# Patient Record
Sex: MALE | Race: BLACK OR AFRICAN AMERICAN | NOT HISPANIC OR LATINO | URBAN - METROPOLITAN AREA
[De-identification: names, ages, dates, MRNs, and addresses within clinical notes are randomized per-mention and may not be internally consistent; named-entity substitution may affect disease eponyms.]

---

## 2017-08-24 ENCOUNTER — EMERGENCY (EMERGENCY)
Facility: HOSPITAL | Age: 77
LOS: 1 days | Discharge: HOME | End: 2017-08-24
Attending: EMERGENCY MEDICINE | Admitting: EMERGENCY MEDICINE
Payer: MEDICARE

## 2017-08-24 VITALS
OXYGEN SATURATION: 100 % | SYSTOLIC BLOOD PRESSURE: 141 MMHG | DIASTOLIC BLOOD PRESSURE: 89 MMHG | RESPIRATION RATE: 18 BRPM | HEART RATE: 97 BPM

## 2017-08-24 VITALS
DIASTOLIC BLOOD PRESSURE: 98 MMHG | OXYGEN SATURATION: 98 % | TEMPERATURE: 99 F | SYSTOLIC BLOOD PRESSURE: 141 MMHG | HEART RATE: 98 BPM | RESPIRATION RATE: 16 BRPM

## 2017-08-24 PROBLEM — Z00.00 ENCOUNTER FOR PREVENTIVE HEALTH EXAMINATION: Status: ACTIVE | Noted: 2017-08-24

## 2017-08-24 PROCEDURE — 12002 RPR S/N/AX/GEN/TRNK2.6-7.5CM: CPT

## 2017-08-24 PROCEDURE — 99285 EMERGENCY DEPT VISIT HI MDM: CPT | Mod: 25,GC

## 2017-08-24 PROCEDURE — 70450 CT HEAD/BRAIN W/O DYE: CPT | Mod: 26

## 2017-08-24 RX ORDER — ACETAMINOPHEN 500 MG
650 TABLET ORAL ONCE
Qty: 0 | Refills: 0 | Status: COMPLETED | OUTPATIENT
Start: 2017-08-24 | End: 2017-08-24

## 2017-08-24 RX ADMIN — Medication 650 MILLIGRAM(S): at 11:10

## 2017-08-24 NOTE — ED PROCEDURE NOTE - CPROC ED POST PROC CARE GUIDE1
Verbal/written post procedure instructions were given to patient/caregiver./Instructed patient/caregiver to follow-up with primary care physician./Instructed patient/caregiver regarding signs and symptoms of infection./Keep the cast/splint/dressing clean and dry.
Instructed patient/caregiver to follow-up with primary care physician./Verbal/written post procedure instructions were given to patient/caregiver./Keep the cast/splint/dressing clean and dry./Instructed patient/caregiver regarding signs and symptoms of infection.
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
## 2017-08-24 NOTE — ED PROVIDER NOTE - PLAN OF CARE
You were seen today and found to have a laceration.  This was repaired with stitches.  Be sure to keep the wound clean.  It is ok to shower as usual, but do not let the wound soak.  No pools, baths, or hot tubs.  Be sure to follow up with your primary care physician in 5-7 days for re-evaluation to see if the stitches are ready to come out.  RETURN TO THE EMERGENCY DEPARTMENT IMMEDIATELY IF YOU DEVELOP PUS DRAINING FROM THE WOUND, SPREADING REDNESS, OR FOR ANY OTHER CONCERN.
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
## 2017-08-24 NOTE — ED PROCEDURE NOTE - CPROC ED LACER REPAIR DETAIL1
No foreign body/The wound was explored to base in bloodless field.
No foreign body/The wound was explored to base in bloodless field.
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
## 2017-08-24 NOTE — ED PROCEDURE NOTE - NS_EDPROVIDERDISPOUSERTYPE_ED_A_ED
Attending Attestation (For Attendings USE Only)...
Attending Attestation (For Attendings USE Only)...

## 2022-08-07 ENCOUNTER — EMERGENCY (EMERGENCY)
Facility: HOSPITAL | Age: 82
LOS: 1 days | Discharge: HOME | End: 2022-08-07
Admitting: EMERGENCY MEDICINE

## 2022-08-07 VITALS
RESPIRATION RATE: 16 BRPM | TEMPERATURE: 98 F | SYSTOLIC BLOOD PRESSURE: 155 MMHG | HEART RATE: 60 BPM | OXYGEN SATURATION: 98 % | DIASTOLIC BLOOD PRESSURE: 84 MMHG

## 2022-08-07 VITALS
TEMPERATURE: 97 F | WEIGHT: 175.05 LBS | DIASTOLIC BLOOD PRESSURE: 98 MMHG | HEART RATE: 65 BPM | SYSTOLIC BLOOD PRESSURE: 155 MMHG | RESPIRATION RATE: 18 BRPM | OXYGEN SATURATION: 98 %

## 2022-08-07 LAB
ALBUMIN SERPL ELPH-MCNC: 4.1 G/DL — SIGNIFICANT CHANGE UP (ref 3.4–5)
ALP SERPL-CCNC: 104 U/L — SIGNIFICANT CHANGE UP (ref 40–120)
ALT FLD-CCNC: 62 U/L — HIGH (ref 12–42)
ANION GAP SERPL CALC-SCNC: 4 MMOL/L — LOW (ref 9–16)
APPEARANCE UR: CLEAR — SIGNIFICANT CHANGE UP
AST SERPL-CCNC: 24 U/L — SIGNIFICANT CHANGE UP (ref 15–37)
BACTERIA # UR AUTO: PRESENT /HPF
BASOPHILS # BLD AUTO: 0.09 K/UL — SIGNIFICANT CHANGE UP (ref 0–0.2)
BASOPHILS NFR BLD AUTO: 1.1 % — SIGNIFICANT CHANGE UP (ref 0–2)
BILIRUB SERPL-MCNC: 0.6 MG/DL — SIGNIFICANT CHANGE UP (ref 0.2–1.2)
BILIRUB UR-MCNC: NEGATIVE — SIGNIFICANT CHANGE UP
BUN SERPL-MCNC: 14 MG/DL — SIGNIFICANT CHANGE UP (ref 7–23)
CALCIUM SERPL-MCNC: 9.1 MG/DL — SIGNIFICANT CHANGE UP (ref 8.5–10.5)
CHLORIDE SERPL-SCNC: 107 MMOL/L — SIGNIFICANT CHANGE UP (ref 96–108)
CO2 SERPL-SCNC: 27 MMOL/L — SIGNIFICANT CHANGE UP (ref 22–31)
COLOR SPEC: YELLOW — SIGNIFICANT CHANGE UP
COMMENT - URINE: SIGNIFICANT CHANGE UP
CREAT SERPL-MCNC: 0.98 MG/DL — SIGNIFICANT CHANGE UP (ref 0.5–1.3)
DIFF PNL FLD: NEGATIVE — SIGNIFICANT CHANGE UP
EGFR: 78 ML/MIN/1.73M2 — SIGNIFICANT CHANGE UP
EOSINOPHIL # BLD AUTO: 0.25 K/UL — SIGNIFICANT CHANGE UP (ref 0–0.5)
EOSINOPHIL NFR BLD AUTO: 3.2 % — SIGNIFICANT CHANGE UP (ref 0–6)
GLUCOSE SERPL-MCNC: 141 MG/DL — HIGH (ref 70–99)
GLUCOSE UR QL: NEGATIVE — SIGNIFICANT CHANGE UP
HCT VFR BLD CALC: 47.9 % — SIGNIFICANT CHANGE UP (ref 39–50)
HGB BLD-MCNC: 16.1 G/DL — SIGNIFICANT CHANGE UP (ref 13–17)
IMM GRANULOCYTES NFR BLD AUTO: 0.4 % — SIGNIFICANT CHANGE UP (ref 0–1.5)
KETONES UR-MCNC: NEGATIVE — SIGNIFICANT CHANGE UP
LEUKOCYTE ESTERASE UR-ACNC: NEGATIVE — SIGNIFICANT CHANGE UP
LYMPHOCYTES # BLD AUTO: 1.58 K/UL — SIGNIFICANT CHANGE UP (ref 1–3.3)
LYMPHOCYTES # BLD AUTO: 19.9 % — SIGNIFICANT CHANGE UP (ref 13–44)
MCHC RBC-ENTMCNC: 31.7 PG — SIGNIFICANT CHANGE UP (ref 27–34)
MCHC RBC-ENTMCNC: 33.6 GM/DL — SIGNIFICANT CHANGE UP (ref 32–36)
MCV RBC AUTO: 94.3 FL — SIGNIFICANT CHANGE UP (ref 80–100)
MONOCYTES # BLD AUTO: 0.95 K/UL — HIGH (ref 0–0.9)
MONOCYTES NFR BLD AUTO: 12 % — SIGNIFICANT CHANGE UP (ref 2–14)
NEUTROPHILS # BLD AUTO: 5.02 K/UL — SIGNIFICANT CHANGE UP (ref 1.8–7.4)
NEUTROPHILS NFR BLD AUTO: 63.4 % — SIGNIFICANT CHANGE UP (ref 43–77)
NITRITE UR-MCNC: NEGATIVE — SIGNIFICANT CHANGE UP
NRBC # BLD: 0 /100 WBCS — SIGNIFICANT CHANGE UP (ref 0–0)
PH UR: 6 — SIGNIFICANT CHANGE UP (ref 5–8)
PLATELET # BLD AUTO: 165 K/UL — SIGNIFICANT CHANGE UP (ref 150–400)
POTASSIUM SERPL-MCNC: 4 MMOL/L — SIGNIFICANT CHANGE UP (ref 3.5–5.3)
POTASSIUM SERPL-SCNC: 4 MMOL/L — SIGNIFICANT CHANGE UP (ref 3.5–5.3)
PROT SERPL-MCNC: 8.2 G/DL — SIGNIFICANT CHANGE UP (ref 6.4–8.2)
PROT UR-MCNC: 30 MG/DL
RBC # BLD: 5.08 M/UL — SIGNIFICANT CHANGE UP (ref 4.2–5.8)
RBC # FLD: 14.2 % — SIGNIFICANT CHANGE UP (ref 10.3–14.5)
RBC CASTS # UR COMP ASSIST: < 5 /HPF — SIGNIFICANT CHANGE UP
SARS-COV-2 RNA SPEC QL NAA+PROBE: SIGNIFICANT CHANGE UP
SODIUM SERPL-SCNC: 138 MMOL/L — SIGNIFICANT CHANGE UP (ref 132–145)
SP GR SPEC: 1.02 — SIGNIFICANT CHANGE UP (ref 1–1.03)
UROBILINOGEN FLD QL: 0.2 E.U./DL — SIGNIFICANT CHANGE UP
WBC # BLD: 7.92 K/UL — SIGNIFICANT CHANGE UP (ref 3.8–10.5)
WBC # FLD AUTO: 7.92 K/UL — SIGNIFICANT CHANGE UP (ref 3.8–10.5)
WBC UR QL: < 5 /HPF — SIGNIFICANT CHANGE UP

## 2022-08-07 PROCEDURE — 71045 X-RAY EXAM CHEST 1 VIEW: CPT | Mod: 26

## 2022-08-07 PROCEDURE — 99284 EMERGENCY DEPT VISIT MOD MDM: CPT | Mod: CS,25

## 2022-08-07 PROCEDURE — 93010 ELECTROCARDIOGRAM REPORT: CPT

## 2022-08-07 RX ORDER — CEFUROXIME AXETIL 250 MG
250 TABLET ORAL ONCE
Refills: 0 | Status: COMPLETED | OUTPATIENT
Start: 2022-08-07 | End: 2022-08-07

## 2022-08-07 RX ORDER — CIPROFLOXACIN LACTATE 400MG/40ML
1 VIAL (ML) INTRAVENOUS
Qty: 7 | Refills: 0
Start: 2022-08-07 | End: 2022-08-13

## 2022-08-07 RX ADMIN — Medication 250 MILLIGRAM(S): at 14:53

## 2022-08-07 NOTE — ED PROVIDER NOTE - PATIENT PORTAL LINK FT
You can access the FollowMyHealth Patient Portal offered by [facility] by registering at the following website: [email or web address]. By joining [employer]’s FollowMyHealth portal, you will also be able to view your health information using other applications (apps) compatible with our system.

## 2022-08-07 NOTE — ED ADULT NURSE NOTE - CHIEF COMPLAINT QUOTE
pt is visually impaired brought in from [facility] for lack of appetite- pt with no other medical complaints

## 2022-08-07 NOTE — ED ADULT NURSE NOTE - OBJECTIVE STATEMENT
BIBA from [facility] presents to ED for further evaluation of loss of appetite for the past few days. pt denies any pain, dizziness, sob. pt in NAD. +visually impaired

## 2022-08-07 NOTE — ED PROVIDER NOTE - OBJECTIVE STATEMENT
83 y/o M with PMH of CVA, glaucoma, hypertension, hyperlipidemia, BPH, and COPD [ex-tobacco user] presents to the ED from [facility] for anorexia. Pt states his symptoms began 3 days ago which was the same day he stopped smoking marijuana in the setting of a cough. Pt states the coughs have since resolved. Pt denies fevers, chills, body aches, N/V, Abd pain, CP, SOB, or syncope.

## 2022-08-07 NOTE — ED PROVIDER NOTE - CLINICAL SUMMARY MEDICAL DECISION MAKING FREE TEXT BOX
81 y/o M presenting with 3 days of anorexia. On exam, Pt appears well and in no acute distress. Plan for routine labs and CXR. Will continue to monitor.

## 2022-08-07 NOTE — ED PROVIDER NOTE - CROS ED CONS ALL NEG
The patient denies any symptoms of myositis or liver dysfunction on current therapy. CPM including diet, weight reduction and exercise pending labs. Target LDL <70, triglycerides <150 and HDL >40.  Follow BMI at each visit.        negative...
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
## 2022-08-07 NOTE — ED PROVIDER NOTE - NSICDXPASTMEDICALHX_GEN_ALL_CORE_FT
PAST MEDICAL HISTORY:  BPH (benign prostatic hyperplasia)     COPD (chronic obstructive pulmonary disease)     CVA (cerebrovascular accident)     Glaucoma     HLD (hyperlipidemia)     HTN (hypertension)

## 2022-08-09 DIAGNOSIS — Z86.73 PERSONAL HISTORY OF TRANSIENT ISCHEMIC ATTACK (TIA), AND CEREBRAL INFARCTION WITHOUT RESIDUAL DEFICITS: ICD-10-CM

## 2022-08-09 DIAGNOSIS — R05.9 COUGH, UNSPECIFIED: ICD-10-CM

## 2022-08-09 DIAGNOSIS — I44.0 ATRIOVENTRICULAR BLOCK, FIRST DEGREE: ICD-10-CM

## 2022-08-09 DIAGNOSIS — Z88.5 ALLERGY STATUS TO NARCOTIC AGENT: ICD-10-CM

## 2022-08-09 DIAGNOSIS — Z20.822 CONTACT WITH AND (SUSPECTED) EXPOSURE TO COVID-19: ICD-10-CM

## 2022-08-09 DIAGNOSIS — I10 ESSENTIAL (PRIMARY) HYPERTENSION: ICD-10-CM

## 2022-08-09 DIAGNOSIS — Z87.891 PERSONAL HISTORY OF NICOTINE DEPENDENCE: ICD-10-CM

## 2022-08-09 DIAGNOSIS — E78.5 HYPERLIPIDEMIA, UNSPECIFIED: ICD-10-CM

## 2022-08-09 DIAGNOSIS — J44.9 CHRONIC OBSTRUCTIVE PULMONARY DISEASE, UNSPECIFIED: ICD-10-CM

## 2022-08-09 DIAGNOSIS — R00.1 BRADYCARDIA, UNSPECIFIED: ICD-10-CM

## 2022-08-09 DIAGNOSIS — N40.0 BENIGN PROSTATIC HYPERPLASIA WITHOUT LOWER URINARY TRACT SYMPTOMS: ICD-10-CM

## 2022-08-09 DIAGNOSIS — J18.9 PNEUMONIA, UNSPECIFIED ORGANISM: ICD-10-CM

## 2022-08-29 PROBLEM — I10 ESSENTIAL (PRIMARY) HYPERTENSION: Chronic | Status: ACTIVE | Noted: 2017-08-24

## 2022-08-29 PROBLEM — H54.42 BLINDNESS, LEFT EYE, NORMAL VISION RIGHT EYE: Chronic | Status: ACTIVE | Noted: 2017-08-24

## 2022-08-29 PROBLEM — J44.9 CHRONIC OBSTRUCTIVE PULMONARY DISEASE, UNSPECIFIED: Chronic | Status: ACTIVE | Noted: 2017-08-24

## 2023-02-08 NOTE — ED PROVIDER NOTE - NSFOLLOWUPINSTRUCTIONS_ED_ALL_ED_FT
Pt. will have appt. for ongoing mental health care within 5-7 days of discharge  Pt. will have safe housing upon discharge.  Social work will follow up to insure Pts. benefits are in place  SW to explore with pt. the interest and need for substance use referral for discharge
Pneumonia    Pneumonia is an infection of the lungs. Pneumonia may be caused by bacteria, viruses, or funguses. Symptoms include coughing, fever, chest pain when breathing deeply or coughing, shortness of breath, fatigue, or muscle aches. Pneumonia can be diagnosed with a medical history and physical exam, as well as other tests which may include a chest X-ray. If you were prescribed an antibiotic medicine, take it as told by your health care provider and do not stop taking the antibiotic even if you start to feel better. Do not use tobacco products, including cigarettes, chewing tobacco, and e-cigarettes.    SEEK IMMEDIATE MEDICAL CARE IF YOU HAVE ANY OF THE FOLLOWING SYMPTOMS: worsening shortness of breath, worsening chest pain, coughing up blood, change in mental status, lightheadedness/dizziness.

## 2023-06-16 ENCOUNTER — EMERGENCY (EMERGENCY)
Facility: HOSPITAL | Age: 83
LOS: 1 days | Discharge: HOME | End: 2023-06-16
Admitting: EMERGENCY MEDICINE
Payer: MEDICARE

## 2023-06-16 VITALS
DIASTOLIC BLOOD PRESSURE: 80 MMHG | OXYGEN SATURATION: 96 % | TEMPERATURE: 98 F | RESPIRATION RATE: 16 BRPM | HEART RATE: 61 BPM | SYSTOLIC BLOOD PRESSURE: 127 MMHG

## 2023-06-16 VITALS
DIASTOLIC BLOOD PRESSURE: 82 MMHG | RESPIRATION RATE: 16 BRPM | HEIGHT: 69 IN | OXYGEN SATURATION: 95 % | HEART RATE: 69 BPM | TEMPERATURE: 98 F | WEIGHT: 173.06 LBS | SYSTOLIC BLOOD PRESSURE: 131 MMHG

## 2023-06-16 PROBLEM — J44.9 CHRONIC OBSTRUCTIVE PULMONARY DISEASE, UNSPECIFIED: Chronic | Status: ACTIVE | Noted: 2022-08-07

## 2023-06-16 PROBLEM — I63.9 CEREBRAL INFARCTION, UNSPECIFIED: Chronic | Status: ACTIVE | Noted: 2022-08-07

## 2023-06-16 PROBLEM — N40.0 BENIGN PROSTATIC HYPERPLASIA WITHOUT LOWER URINARY TRACT SYMPTOMS: Chronic | Status: ACTIVE | Noted: 2022-08-07

## 2023-06-16 PROBLEM — E78.5 HYPERLIPIDEMIA, UNSPECIFIED: Chronic | Status: ACTIVE | Noted: 2022-08-07

## 2023-06-16 PROBLEM — I10 ESSENTIAL (PRIMARY) HYPERTENSION: Chronic | Status: ACTIVE | Noted: 2022-08-07

## 2023-06-16 PROCEDURE — 99284 EMERGENCY DEPT VISIT MOD MDM: CPT

## 2023-06-16 PROCEDURE — 73080 X-RAY EXAM OF ELBOW: CPT | Mod: 26,LT

## 2023-06-16 PROCEDURE — 73090 X-RAY EXAM OF FOREARM: CPT | Mod: 26,LT
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
## 2023-06-16 NOTE — ED PROVIDER NOTE - PHYSICAL EXAMINATION
Splint overlying left elbow and forearm.  Cap refill less than 2 seconds able to move all fingers radial pulses are equal bilaterally.
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
## 2023-06-16 NOTE — ED PROVIDER NOTE - PATIENT PORTAL LINK FT
You can access the FollowMyHealth Patient Portal offered by [facility] by registering at the following website: [email or web address]. By joining [employer]’s FollowMyHealth portal, you will also be able to view your health information using other applications (apps) compatible with our system.

## 2023-06-16 NOTE — ED ADULT NURSE NOTE - NSFALLUNIVINTERV_ED_ALL_ED
Bed/Stretcher in lowest position, wheels locked, appropriate side rails in place/Call bell, personal items and telephone in reach/Instruct patient to call for assistance before getting out of bed/chair/stretcher/Non-slip footwear applied when patient is off stretcher/[place] to call system/Physically safe environment - no spills, clutter or unnecessary equipment/Purposeful proactive rounding/Room/bathroom lighting operational, light cord in reach
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
## 2023-06-16 NOTE — ED PROVIDER NOTE - NSICDXPASTMEDICALHX_GEN_ALL_CORE_FT
PAST MEDICAL HISTORY:  Blind left eye     BPH (benign prostatic hyperplasia)     COPD (chronic obstructive pulmonary disease)     COPD (chronic obstructive pulmonary disease)     CVA (cerebrovascular accident)     Glaucoma     HLD (hyperlipidemia)     HTN (hypertension)     Hypertension
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
## 2023-06-18 DIAGNOSIS — Z86.73 PERSONAL HISTORY OF TRANSIENT ISCHEMIC ATTACK (TIA), AND CEREBRAL INFARCTION WITHOUT RESIDUAL DEFICITS: ICD-10-CM

## 2023-06-18 DIAGNOSIS — Z47.89 ENCOUNTER FOR OTHER ORTHOPEDIC AFTERCARE: ICD-10-CM

## 2023-06-18 DIAGNOSIS — J44.9 CHRONIC OBSTRUCTIVE PULMONARY DISEASE, UNSPECIFIED: ICD-10-CM

## 2023-06-18 DIAGNOSIS — H54.7 UNSPECIFIED VISUAL LOSS: ICD-10-CM

## 2023-06-18 DIAGNOSIS — I10 ESSENTIAL (PRIMARY) HYPERTENSION: ICD-10-CM

## 2023-06-18 DIAGNOSIS — Z88.5 ALLERGY STATUS TO NARCOTIC AGENT: ICD-10-CM

## 2023-06-18 DIAGNOSIS — N40.0 BENIGN PROSTATIC HYPERPLASIA WITHOUT LOWER URINARY TRACT SYMPTOMS: ICD-10-CM

## 2023-06-18 DIAGNOSIS — E78.5 HYPERLIPIDEMIA, UNSPECIFIED: ICD-10-CM

## 2023-07-31 NOTE — ED ADULT NURSE NOTE - ED COMFORT CARE
Patient informed/Explanation of wait/Warm blanket Mucosal Advancement Flap Text: Given the location of the defect, shape of the defect and the proximity to free margins a mucosal advancement flap was deemed most appropriate. Incisions were made with a 15 blade scalpel in the appropriate fashion along the cutaneous vermilion border and the mucosal lip. The remaining actinically damaged mucosal tissue was excised.  The mucosal advancement flap was then elevated to the gingival sulcus with care taken to preserve the neurovascular structures and advanced into the primary defect. Care was taken to ensure that precise realignment of the vermilion border was achieved.

## 2024-05-02 ENCOUNTER — EMERGENCY (EMERGENCY)
Facility: HOSPITAL | Age: 84
LOS: 1 days | Discharge: HOME | End: 2024-05-02
Attending: STUDENT IN AN ORGANIZED HEALTH CARE EDUCATION/TRAINING PROGRAM | Admitting: STUDENT IN AN ORGANIZED HEALTH CARE EDUCATION/TRAINING PROGRAM
Payer: MEDICARE

## 2024-05-02 VITALS
OXYGEN SATURATION: 95 % | HEART RATE: 103 BPM | SYSTOLIC BLOOD PRESSURE: 137 MMHG | TEMPERATURE: 98 F | RESPIRATION RATE: 18 BRPM | DIASTOLIC BLOOD PRESSURE: 88 MMHG

## 2024-05-02 PROCEDURE — 99283 EMERGENCY DEPT VISIT LOW MDM: CPT

## 2024-05-02 RX ORDER — PILOCARPINE HCL 4 %
1 DROPS OPHTHALMIC (EYE) ONCE
Refills: 0 | Status: COMPLETED | OUTPATIENT
Start: 2024-05-02 | End: 2024-05-02

## 2024-05-02 RX ORDER — DORZOLAMIDE HYDROCHLORIDE 20 MG/ML
1 SOLUTION/ DROPS OPHTHALMIC ONCE
Refills: 0 | Status: COMPLETED | OUTPATIENT
Start: 2024-05-02 | End: 2024-05-02

## 2024-05-02 RX ORDER — LATANOPROST 0.05 MG/ML
1 SOLUTION/ DROPS OPHTHALMIC; TOPICAL ONCE
Refills: 0 | Status: COMPLETED | OUTPATIENT
Start: 2024-05-02 | End: 2024-05-02

## 2024-05-02 RX ORDER — TIMOLOL 0.5 %
1 DROPS OPHTHALMIC (EYE) ONCE
Refills: 0 | Status: COMPLETED | OUTPATIENT
Start: 2024-05-02 | End: 2024-05-02

## 2024-05-02 RX ORDER — ACETAZOLAMIDE 250 MG/1
500 TABLET ORAL ONCE
Refills: 0 | Status: COMPLETED | OUTPATIENT
Start: 2024-05-02 | End: 2024-05-02

## 2024-05-02 RX ORDER — BRIMONIDINE TARTRATE 2 MG/MG
1 SOLUTION/ DROPS OPHTHALMIC ONCE
Refills: 0 | Status: COMPLETED | OUTPATIENT
Start: 2024-05-02 | End: 2024-05-02

## 2024-05-02 RX ADMIN — BRIMONIDINE TARTRATE 1 DROP(S): 2 SOLUTION/ DROPS OPHTHALMIC at 23:52

## 2024-05-02 RX ADMIN — Medication 1 DROP(S): at 23:48

## 2024-05-02 RX ADMIN — ACETAZOLAMIDE 500 MILLIGRAM(S): 250 TABLET ORAL at 23:47

## 2024-05-02 RX ADMIN — DORZOLAMIDE HYDROCHLORIDE 1 DROP(S): 20 SOLUTION/ DROPS OPHTHALMIC at 23:50

## 2024-05-02 NOTE — ED ADULT NURSE NOTE - OBJECTIVE STATEMENT
Patient is an 83 y/o M c/o eye pain/injury. patient reports right eye pain that has worsened over the past 2 days. Patient reports hx of b/l eye deficits due to cva in 2012. patient blind in both eyes. patient has redness and watery eyes. patient also has hx of glaucoma.

## 2024-05-02 NOTE — ED PROVIDER NOTE - PROGRESS NOTE DETAILS
Carol Ann Gonzalez MD (PGY3): spoke to ophtho fellow on call. Discussed how patient is blind and conformed that he does not see any light at all (all black), will treat symptomatically given his pain with timilol, diamox, brimonidine, lantanoprostol, dorzmalide. Will send rx of lantanoprostol, dorzmalide. will give rapid referral to Optho clinic. Carol Ann Gonzalez MD (PGY3): Patient states he has improvement in pain. repeat pressures ~45. Given patient has ability to have compromised vision given blindness, will d/c with gtts which were provided. patient to see clinic in AM.

## 2024-05-02 NOTE — ED PROVIDER NOTE - ATTENDING CONTRIBUTION TO CARE
I have personally performed a face to face bedside history and physical examination of this patient. I have discussed the history, examination, review of systems, assessment and plan of management with the resident/ PA/ NP. I have reviewed the electronic medical record and amended it to reflect my history, review of systems, physical exam, assessment and plan.

## 2024-05-02 NOTE — ED PROVIDER NOTE - OBJECTIVE STATEMENT
Patient is an 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), CVA c/b L eye blindness, COPD, BPH who presents to the ED with R eye pain worsening over past 2 weeks. Went to his glaucoma and retinal specialist 1 mo ago and presures 30 at that time in R eye. Has been using Brimo-timilol, Rockaltan, prednisolone and atropine drops as prescribed. No trauma. Patient states he cannot see anything including light in eye, completely black.

## 2024-05-02 NOTE — ED PROVIDER NOTE - CLINICAL SUMMARY MEDICAL DECISION MAKING FREE TEXT BOX
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), CVA c/b L eye blindness, COPD, BPH coming with worsening R eye pain x2 weeks. Baseline pressure 30, today avg 56 in OD, 4 OS. R pupil dilated and fixed. given patient is blind, will speak with ophtho regarding treatment or need for referral. will likely give ggt to reduce pressure and reassess.

## 2024-05-02 NOTE — ED PROVIDER NOTE - PHYSICAL EXAMINATION
General: Alert and Orientated x 3. No apparent distress.  Head: Normocephalic and atraumatic.  Eyes: OD: dilated pupil, not reactive. Pressure avg 46. +injection. Unable to assess acuity given blindness. OS: Pupil 3mm, not reactive (baseline), no injection, Pressure  avg 4,unable to assess acutiy.   Neck: Supple. Trachea midline.   Cardiac: Normal S1 and S2 w/ RRR. No murmurs appreciated. No JVD appreciated.  Pulmonary: CTA bilaterally. No increased WOB. No wheezes or crackles.  Abdominal: Soft, non-tender. (+) bowel sounds appreciated in all 4 quadrants. No hepatosplenomegaly.   Neurologic: No focal sensory or motor deficits unless states above.   Musculoskeletal: Strength appropriate in all 4 extremities for age with no limited ROM.  Skin: Color appropriate for race. Intact, warm, and well-perfused.  Psychiatric: Appropriate mood and affect. No apparent risk to self or others.

## 2024-05-02 NOTE — ED PROVIDER NOTE - NSFOLLOWUPINSTRUCTIONS_ED_ALL_ED_FT
TWO EYE DROPS WERE ADDED TO MR. GARCIA'S REGIMEN:     1. latanoprost once a day in R eye   2. Dorzolamide once a day in R eye    CONTINUE ALL OTHER EYE DROPS AS PREVIOUSLY PRESCRIBED.     HE WILL RECEIVE A CALL TO MAKE AN APPOINT AND SEE THE OPHTHALMOLOGIST TODAY.     Glaucoma happens when the fluid pressure in the eyeball is too high. If the pressure stays high for too long, the eye may get damaged. This can cause a loss of vision. The most common type of glaucoma causes pressure in the eye to go up slowly. There may be no symptoms at first. Testing for this condition can help to find the condition before damage happens. Early treatment can often stop vision loss.    Follow these instructions at home:  Take over-the-counter and prescription medicines only as told by your doctor.    If you were given eye drops, use them exactly as told. You will probably need to use this medicine for the rest of your life.  Exercise often. Talk with your doctor about which types of exercise are safe for you. Do not do exercises where you lean your head on the floor while you lift part of your body off the floor (such as a headstand).  Keep all follow-up visits as told by your doctor. This is important.    Contact a doctor if:  Your symptoms get worse.  You have new symptoms.    Get help right away if:  You have bad pain in your eye.  You have vision problems.  You have a bad headache in the area around your eye.  You feel sick to your stomach (nauseous).  You throw up (vomit).  You start to have the same problems with your other eye.    Summary  Glaucoma happens when the fluid pressure in the eyeball is too high. If this is not treated, it can cause a loss of vision.  There may be no symptoms at first. Testing can help to find the condition before damage happens.  Early treatment can often stop vision loss.    ADDITIONAL NOTES AND INSTRUCTIONS    Please follow up with your Primary MD in 24-48 hr.  Seek immediate medical care for any new/worsening signs or symptoms.

## 2024-05-02 NOTE — ED PROVIDER NOTE - PATIENT PORTAL LINK FT
You can access the FollowMyHealth Patient Portal offered by [facility] by registering at the following website: [email or web address]. By joining [employer]’s FollowMyHealth portal, you will also be able to view your health information using other applications (apps) compatible with our system.

## 2024-05-02 NOTE — ED PROVIDER NOTE - NS ED ROS FT
CONSTITUTIONAL: No fevers, no chills, no lightheadedness, no dizziness  EYES: see hpi   EARS: no ear drainage, no ear pain, no change in hearing  NOSE: no nasal congestion  MOUTH/THROAT: no sore throat  CV: No chest pain, no palpitations  RESP: No SOB, no cough  GI: No n/v/d, no abd pain  : no dysuria, no hematuria, no flank pain  MSK: no back pain, no extremity pain  SKIN: no rashes  NEURO: no headache, no focal weakness, no decreased sensation/parasthesias   PSYCHIATRIC: no known mental health issues

## 2024-05-02 NOTE — ED ADULT TRIAGE NOTE - CHIEF COMPLAINT QUOTE
right eye pain , is blind in both eyes from previous cva, nil trauma, nil obvious injury, pt states pain "for a couple of weeks" , has been told he might have to have eye removed if pain persists, gets regular shots for pain relief

## 2024-05-02 NOTE — ED PROVIDER NOTE - NS ED ATTENDING STATEMENT MOD
I have seen and examined this patient and fully participated in the care of this patient as the teaching attending.  The service was shared with the SACHIN.  I reviewed and verified the documentation.

## 2024-05-03 VITALS
TEMPERATURE: 98 F | SYSTOLIC BLOOD PRESSURE: 134 MMHG | HEART RATE: 84 BPM | DIASTOLIC BLOOD PRESSURE: 77 MMHG | OXYGEN SATURATION: 99 % | RESPIRATION RATE: 17 BRPM

## 2024-05-03 RX ADMIN — LATANOPROST 1 DROP(S): 0.05 SOLUTION/ DROPS OPHTHALMIC; TOPICAL at 00:05

## 2024-05-03 NOTE — ED ADULT NURSE REASSESSMENT NOTE - NS ED NURSE REASSESS COMMENT FT1
pt provided d/c paperwork w/ eyedrops to take home given by provider, pt vitals stable, no other concerns are noted.

## 2024-05-06 ENCOUNTER — APPOINTMENT (OUTPATIENT)
Dept: OPHTHALMOLOGY | Facility: CLINIC | Age: 84
End: 2024-05-06
Payer: MEDICARE

## 2024-05-06 ENCOUNTER — NON-APPOINTMENT (OUTPATIENT)
Age: 84
End: 2024-05-06

## 2024-05-06 DIAGNOSIS — N40.0 BENIGN PROSTATIC HYPERPLASIA WITHOUT LOWER URINARY TRACT SYMPTOMS: ICD-10-CM

## 2024-05-06 DIAGNOSIS — H40.211 ACUTE ANGLE-CLOSURE GLAUCOMA, RIGHT EYE: ICD-10-CM

## 2024-05-06 DIAGNOSIS — J44.9 CHRONIC OBSTRUCTIVE PULMONARY DISEASE, UNSPECIFIED: ICD-10-CM

## 2024-05-06 DIAGNOSIS — Z88.5 ALLERGY STATUS TO NARCOTIC AGENT: ICD-10-CM

## 2024-05-06 PROCEDURE — 92004 COMPRE OPH EXAM NEW PT 1/>: CPT

## 2024-05-06 PROCEDURE — 76512 OPH US DX B-SCAN: CPT | Mod: LT

## 2024-05-09 ENCOUNTER — EMERGENCY (EMERGENCY)
Facility: HOSPITAL | Age: 84
LOS: 1 days | Discharge: HOME | End: 2024-05-09
Attending: EMERGENCY MEDICINE | Admitting: EMERGENCY MEDICINE
Payer: MEDICARE

## 2024-05-09 VITALS
RESPIRATION RATE: 18 BRPM | DIASTOLIC BLOOD PRESSURE: 73 MMHG | TEMPERATURE: 98 F | SYSTOLIC BLOOD PRESSURE: 117 MMHG | OXYGEN SATURATION: 93 % | HEART RATE: 75 BPM

## 2024-05-09 DIAGNOSIS — R51.9 HEADACHE, UNSPECIFIED: ICD-10-CM

## 2024-05-09 DIAGNOSIS — G44.89 OTHER HEADACHE SYNDROME: ICD-10-CM

## 2024-05-09 DIAGNOSIS — Z86.73 PERSONAL HISTORY OF TRANSIENT ISCHEMIC ATTACK (TIA), AND CEREBRAL INFARCTION WITHOUT RESIDUAL DEFICITS: ICD-10-CM

## 2024-05-09 DIAGNOSIS — J44.9 CHRONIC OBSTRUCTIVE PULMONARY DISEASE, UNSPECIFIED: ICD-10-CM

## 2024-05-09 DIAGNOSIS — N40.0 BENIGN PROSTATIC HYPERPLASIA WITHOUT LOWER URINARY TRACT SYMPTOMS: ICD-10-CM

## 2024-05-09 DIAGNOSIS — Z88.5 ALLERGY STATUS TO NARCOTIC AGENT: ICD-10-CM

## 2024-05-09 PROCEDURE — 99283 EMERGENCY DEPT VISIT LOW MDM: CPT

## 2024-05-09 NOTE — ED ADULT NURSE NOTE - NS ED NURSE DC INFO COMPLEXITY
Otezla Pregnancy And Lactation Text: This medication is Pregnancy Category C and it isn't known if it is safe during pregnancy. It is unknown if it is excreted in breast milk. Simple: Patient demonstrates quick and easy understanding

## 2024-05-09 NOTE — ED ADULT NURSE NOTE - NSFALLHARMRISKINTERV_ED_ALL_ED

## 2024-05-09 NOTE — ED PROVIDER NOTE - OBJECTIVE STATEMENT
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), CVA c/b L eye blindness, COPD, BPH   Recent visit for worsening right eye glaucoma causing significant pain to right has since followed up with his ophthalmologist yesterday where he received an intraocular injection and has been using his eyedrops as prescribed   medication has been mainly for symptom management of pain as patient is blind at baseline   states that over the past 3 weeks and concordance with his eye pain he has had headaches that wax and wane with management of his eye pressures   states had a headache this morning and told his staff at his supportive [facility] who called 911 for him   in interim he took aspirin and Tylenol and by the time he arrived here his headache is fully resolved     denies sudden onset of headache denies fevers chills or trauma denies new quality of pain or change in nature of pain from prior headaches,   no fever   recent head CT in context of similar headache here     has been compliant with all medications

## 2024-05-09 NOTE — ED ADULT NURSE REASSESSMENT NOTE - NS ED NURSE REASSESS COMMENT FT1
Recevied handoff from Niki guaman. patient resting comfortably in stretcher. Patient pending transportation for dc. all needs met at this time. care continues.

## 2024-05-09 NOTE — ED PROVIDER NOTE - PHYSICAL EXAMINATION
Gen: elderly frail appearing, NAD, VS as noted by nursing. HEENT: NCAT, mmm, b/l scleral injection with significant scleral subconj hematoma on right   supple neck without stiffness and with full range of motion preserved  Chest: RRR, nl S1 and S2, no m/r/g. Resp: CTAB, no w/r/r  Abd: nl BS, soft, nt/nd. Ext: Warm, dry  Neuro: Symmetric cranial nerves except for vision (  bilaterally blindness),  moving all extremities with equal strength    Psych: AAOx3

## 2024-05-09 NOTE — ED PROVIDER NOTE - NSFOLLOWUPINSTRUCTIONS_ED_ALL_ED_FT
today you were seen for a headache which had resolved prior to arrival we determined that there was no need for further evaluation at this time as this was similar to your prior headaches     please return for new sudden worsening of pain, intractable nausea, or vomiting or any new concerning symptom otherwise please follow-up with your primary care tomorrow as scheduled

## 2024-05-09 NOTE — ED PROVIDER NOTE - CLINICAL SUMMARY MEDICAL DECISION MAKING FREE TEXT BOX
resolved headache   most likely related to patient's underlying glaucoma for which he is under treatment   this time no red flags for intracranial hemorrhage or other syndrome requiring imaging or blood work especially as symptoms completely resolved after Tylenol and very similar to prior episodes related to his glaucoma symptoms   patient has follow-up within 1 week with both his glaucoma and retinal specialist as well as his PMD tomorrow   he agrees to the lack of need for further workup at this time and is amenable to discharge back to his supportive care facility   will arrange transport as he is blind

## 2024-05-09 NOTE — ED PROVIDER NOTE - PATIENT PORTAL LINK FT
You can access the FollowMyHealth Patient Portal offered by [facility] by registering at the following website: [email or web address]. By joining [employer]’s FollowMyHealth portal, you will also be able to view your health information using other applications (apps) compatible with our system.

## 2024-05-20 ENCOUNTER — EMERGENCY (EMERGENCY)
Facility: HOSPITAL | Age: 84
LOS: 1 days | Discharge: HOME | End: 2024-05-20
Attending: EMERGENCY MEDICINE | Admitting: EMERGENCY MEDICINE
Payer: MEDICARE

## 2024-05-20 VITALS
OXYGEN SATURATION: 94 % | RESPIRATION RATE: 16 BRPM | TEMPERATURE: 98 F | SYSTOLIC BLOOD PRESSURE: 155 MMHG | HEART RATE: 91 BPM | DIASTOLIC BLOOD PRESSURE: 84 MMHG

## 2024-05-20 DIAGNOSIS — H57.11 OCULAR PAIN, RIGHT EYE: ICD-10-CM

## 2024-05-20 DIAGNOSIS — H40.051 OCULAR HYPERTENSION, RIGHT EYE: ICD-10-CM

## 2024-05-20 DIAGNOSIS — Z88.5 ALLERGY STATUS TO NARCOTIC AGENT: ICD-10-CM

## 2024-05-20 DIAGNOSIS — H54.3 UNQUALIFIED VISUAL LOSS, BOTH EYES: ICD-10-CM

## 2024-05-20 PROCEDURE — 99284 EMERGENCY DEPT VISIT MOD MDM: CPT

## 2024-05-20 RX ORDER — FLUORESCEIN SODIUM 9 MG
1 STRIP OPHTHALMIC (EYE) ONCE
Refills: 0 | Status: COMPLETED | OUTPATIENT
Start: 2024-05-20 | End: 2024-05-20

## 2024-05-20 RX ORDER — IBUPROFEN 200 MG
600 TABLET ORAL ONCE
Refills: 0 | Status: COMPLETED | OUTPATIENT
Start: 2024-05-20 | End: 2024-05-20

## 2024-05-20 RX ORDER — ACETAMINOPHEN 500 MG
975 TABLET ORAL ONCE
Refills: 0 | Status: COMPLETED | OUTPATIENT
Start: 2024-05-20 | End: 2024-05-20

## 2024-05-20 RX ADMIN — Medication 975 MILLIGRAM(S): at 05:09

## 2024-05-20 RX ADMIN — Medication 1 DROP(S): at 04:50

## 2024-05-20 RX ADMIN — Medication 1 APPLICATION(S): at 04:50

## 2024-05-20 RX ADMIN — Medication 600 MILLIGRAM(S): at 05:10

## 2024-05-20 NOTE — ED PROVIDER NOTE - CLINICAL SUMMARY MEDICAL DECISION MAKING FREE TEXT BOX
84-year-old male presenting to to the right presenting with severe eye pain in the setting of active and ongoing treatments by cardio for proposed enucleation.  He is allergic to codeine.  Symptoms are somewhat relieved with fluorescein.  Will give p.o. Motrin and Tylenol try to set up transfer to [place] for ophthalmology evaluation. 84-year-old male presenting to the right presenting with severe eye pain/firm in the setting of active elevated IOP/glaucoma and ongoing treatments by ophtho (proposed enucleation).  He is allergic to codeine.  Symptoms are somewhat relieved with fluorescein.  Will give p.o. Motrin and Tylenol try to set up transfer to [facility] for ophthalmology evaluation.

## 2024-05-20 NOTE — ED PROVIDER NOTE - PATIENT PORTAL LINK FT
You can access the FollowMyHealth Patient Portal offered by [facility] by registering at the following website: [email or web address]. By joining [employer]’s FollowMyHealth portal, you will also be able to view your health information using other applications (apps) compatible with our system.

## 2024-05-20 NOTE — ED ADULT TRIAGE NOTE - CHIEF COMPLAINT QUOTE
pt c/o right eye pain x3 weeks, pt states he was seen in ED x1 week ago for same complaint and saw eye doctor x3 days ago, states pain is worsening tonight, eye noticeably watery- upgraded to Dr Michel

## 2024-05-20 NOTE — ED ADULT NURSE NOTE - OBJECTIVE STATEMENT
Pt with complaint of right eye pain X 3 and vision loss X 3 weeks. Pt reports pain increasing tonight.

## 2024-05-20 NOTE — ED PROVIDER NOTE - NSICDXPASTMEDICALHX_GEN_ALL_CORE_FT
PAST MEDICAL HISTORY:  Blind left eye     BPH (benign prostatic hyperplasia)     COPD (chronic obstructive pulmonary disease)     COPD (chronic obstructive pulmonary disease)     CVA (cerebrovascular accident)     Glaucoma Blind in R eye since Jan 2023    HLD (hyperlipidemia)     HTN (hypertension)     Hypertension

## 2024-05-20 NOTE — ED PROVIDER NOTE - NSFOLLOWUPINSTRUCTIONS_ED_ALL_ED_FT
Please follow-up with your eye doctor as planned.  He wanted me to stressed to you that removal of the eye will probably be the best solution to prevent these recurrent pain episodes.  He also wants you to continue to use the eyedrops as prescribed.

## 2024-05-20 NOTE — ED PROVIDER NOTE - PHYSICAL EXAMINATION
VITAL SIGNS: I have reviewed nursing notes and confirm.  CONST: Well-developed; well-nourished; crying in pain  ENT: No nasal discharge; airway clear.  EYES: R eye injected, cornea foggy, pupil dilated, totally blind in both eyes. Eye feels firm. No uptake with fluorescein.   RESP: Breathing comfortably; speaking in full sentences.   SKIN: Skin is normal temperature; no diaphoresis; no pallor.  PSYCH: Cooperative. Appropriate mood, language, and behavior.

## 2024-05-20 NOTE — ED ADULT NURSE REASSESSMENT NOTE - NS ED NURSE REASSESS COMMENT FT1
Pt assisted to ambulate to bathroom, pt states his eye pain and is better and he would like ot go home instead. Dr. Michel called [facility] and spoke to his opthamologist, plan for dc with outpatient follow up. Pt agreeable.

## 2024-05-20 NOTE — ED PROVIDER NOTE - OBJECTIVE STATEMENT
84-year-old man brought in by EMS for right eye pain and tearing. He has been experiencing increased right eye pain over the past month.  He has had 2 other ED visits for this in our system.  He is apparently under the care of an ophthalmologist and was being treated for glaucoma.  He apparently had an injection in his eye about 2 weeks ago for this. 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 Has been using Brimo-timilol, Rockaltan, prednisolone and atropine drops as prescribed. 84-year-old man brought in by EMS for right eye pain and tearing. He has been experiencing increased right eye pain over the past month.  He has had 2 other ED visits for this here.  He is apparently under the care of an ophthalmologist and was being treated for glaucoma.  He apparently had an injection in his eye about 2 weeks ago for this. He is blind in both eyes and lives in an assisted care facility. On one visit he also had a headache his symptoms got better with aspirin and Tylenol and he was ultimately discharged.    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 Has been using Brimo-timilol, Rockaltan, prednisolone and atropine drops as prescribed. 84-year-old man brought in by EMS for right eye pain and tearing. He has been experiencing increased right eye pain over the past month.  He has had 2 other ED visits for this here.  He is apparently under the care of an ophthalmologist and was being treated for glaucoma.  He apparently had an injection in his eye about 2 weeks ago for this. He most recently saw the ophthalmologist for another injection this past Wednesday.  They told him that they wanted to remove the eye.  He does not want to lose his eye and wants to get a second opinion.  He is followed at [facility].  He is completely blind in both eyes and lives in an assisted care facility. On one visit he also had a headache his symptoms got better with aspirin and Tylenol and he was ultimately discharged.    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 Has been using Brimo-timilol, Rockaltan, prednisolone and atropine drops as prescribed.

## 2024-05-20 NOTE — ED PROVIDER NOTE - PROGRESS NOTE DETAILS
I spoke with the patient's attending ophthalmologist Dr. Perales.  He reports that pressure control in the eye is the only thing that can be done at this time.  He maintains that enucleation is the patient's best option but that he likely is not emotionally ready for that.  He was accepted for transfer to the [place] ED by Dr. Alejandra.  We unfortunately do not have the capability to manage IOP in this ED without additional ophthalmology/pharmacological support. The patient's symptoms have improved.  He has his own eyedrops with them.  He would prefer to go home.  I called the transfer center back and spoke with his ophthalmologist Dr. Hall who recommends that he continue to use the drops, follow-up and to continue to consider the option of enucleation. I spoke with the patient's attending ophthalmologist Dr. Hall.  He reports that pressure control in the eye is the only thing that can be done at this time.  He maintains that enucleation is the patient's best option but that he likely is not emotionally ready for that.  He was accepted for transfer to the [place] ED by Dr. Alejandra.  We unfortunately do not have the capability to manage IOP in this ED without additional ophthalmology/pharmacological support.

## 2024-09-27 NOTE — ED PROVIDER NOTE - NEUROLOGICAL, MLM
Zpak sent to pharmacy for URI/LRI.    Angel Pritchard MD.   Alert and oriented, no focal deficits, no motor or sensory deficits.

## 2024-11-11 NOTE — ED ADULT TRIAGE NOTE - BP NONINVASIVE SYSTOLIC (MM HG)
[facility] is the [facility]. This patient's insurance covers the Stelara subcutaneous injection only if administered at a hospital or clinic. Therefore, we need to include the Stelara injection in the treatment plan.     I will update the T plan now. If anyone has any questions, please feel free to contact me .    [phone]

## 2025-06-03 NOTE — ED ADULT NURSE NOTE - NS_NURSE_DISC_TEACHING_YN_ED_ALL_ED
Call Center TCM Note      Flowsheet Row Responses   [facility] discharged from? [facility]   Does the patient have one of the following disease processes/diagnoses(primary or secondary)? COPD   TCM attempt successful? Yes   Call start time 1008   Call end time 1012   Discharge diagnosis COPD exacerbation   Is patient permission given to speak with other caregiver? Yes   Person spoke with today (if not patient) and relationship dtr Desirae Rodriguez reviewed with patient/caregiver? Yes   Is the patient having any side effects they believe may be caused by any medication additions or changes? No   Does the patient have all medications ordered at discharge? Yes   Prescription comments New rx Losartan to be p/u today. Amlodipine, Amlodipine-benazepril, Carvedilol on discontinued list   Is the patient taking all medications as directed (includes completed medication regime)? Yes   Comments Daughter will call to schedule TCM APPT with PCP GABI Mireles as she has to work around other appts.   Does the patient have an appointment with their PCP within 7-14 days of discharge? No   Nursing Interventions Routed TCM call to PCP office   What is the Home health agency?  [facility]   Has home health visited the patient within 72 hours of discharge? Yes   Has all DME been delivered? No   DME comments Pt has not recieved or heard any info re: new nebulizer or walker. I am reaching out to CM for follow up   Pulse Ox monitoring Intermittent   Pulse Ox device source Patient   Psychosocial issues? No   Did the patient receive a copy of their discharge instructions? Yes   Nursing interventions Reviewed instructions with patient   What is the patient's perception of their health status since discharge? Improving   If the patient is a current smoker, are they able to teach back resources for cessation? Not a smoker   Is the patient/caregiver able to teach back the hierarchy of who to call/visit for  symptoms/problems? PCP, Specialist, Home health nurse, Urgent Care, ED, 911 Yes   TCM call completed? Yes   Wrap up additional comments D/C DX: COPD exacerbation - Per dtr Desirae pt is doing better. [facility] to follow. Daughter will call to schedule TCM APPT with PCP GABI Mireles as she has to work around other appts.   Call end time 1012            ROLAND FORBES - Medical Assistant    6/3/2025, 10:22 EDT         Yes